# Patient Record
Sex: FEMALE | Race: WHITE | NOT HISPANIC OR LATINO
[De-identification: names, ages, dates, MRNs, and addresses within clinical notes are randomized per-mention and may not be internally consistent; named-entity substitution may affect disease eponyms.]

---

## 2019-11-22 ENCOUNTER — APPOINTMENT (OUTPATIENT)
Dept: HUMAN REPRODUCTION | Facility: CLINIC | Age: 39
End: 2019-11-22
Payer: COMMERCIAL

## 2019-11-22 PROCEDURE — 99205 OFFICE O/P NEW HI 60 MIN: CPT | Mod: 25

## 2019-11-22 PROCEDURE — 76830 TRANSVAGINAL US NON-OB: CPT

## 2019-12-27 ENCOUNTER — APPOINTMENT (OUTPATIENT)
Dept: RADIOLOGY | Facility: HOSPITAL | Age: 39
End: 2019-12-27
Payer: COMMERCIAL

## 2019-12-27 ENCOUNTER — OUTPATIENT (OUTPATIENT)
Dept: OUTPATIENT SERVICES | Facility: HOSPITAL | Age: 39
LOS: 1 days | End: 2019-12-27
Payer: COMMERCIAL

## 2019-12-27 DIAGNOSIS — N97.1 FEMALE INFERTILITY OF TUBAL ORIGIN: ICD-10-CM

## 2019-12-27 DIAGNOSIS — Z00.00 ENCOUNTER FOR GENERAL ADULT MEDICAL EXAMINATION WITHOUT ABNORMAL FINDINGS: ICD-10-CM

## 2019-12-27 PROCEDURE — 58340 CATHETER FOR HYSTEROGRAPHY: CPT

## 2019-12-27 PROCEDURE — 74740 X-RAY FEMALE GENITAL TRACT: CPT | Mod: 26

## 2019-12-27 PROCEDURE — 74740 X-RAY FEMALE GENITAL TRACT: CPT

## 2020-02-18 ENCOUNTER — APPOINTMENT (OUTPATIENT)
Dept: HUMAN REPRODUCTION | Facility: CLINIC | Age: 40
End: 2020-02-18

## 2020-03-30 ENCOUNTER — APPOINTMENT (OUTPATIENT)
Dept: HUMAN REPRODUCTION | Facility: CLINIC | Age: 40
End: 2020-03-30

## 2020-04-26 ENCOUNTER — MESSAGE (OUTPATIENT)
Age: 40
End: 2020-04-26

## 2020-05-02 LAB
SARS-COV-2 IGG SERPL IA-ACNC: 0.1 INDEX
SARS-COV-2 IGG SERPL QL IA: NEGATIVE

## 2021-09-21 ENCOUNTER — APPOINTMENT (OUTPATIENT)
Dept: HUMAN REPRODUCTION | Facility: CLINIC | Age: 41
End: 2021-09-21
Payer: COMMERCIAL

## 2021-09-21 PROCEDURE — 99214 OFFICE O/P EST MOD 30 MIN: CPT | Mod: 95

## 2022-07-05 ENCOUNTER — APPOINTMENT (OUTPATIENT)
Dept: OBGYN | Facility: CLINIC | Age: 42
End: 2022-07-05

## 2022-07-05 VITALS
HEIGHT: 65 IN | DIASTOLIC BLOOD PRESSURE: 70 MMHG | WEIGHT: 196 LBS | BODY MASS INDEX: 32.65 KG/M2 | SYSTOLIC BLOOD PRESSURE: 122 MMHG

## 2022-07-05 DIAGNOSIS — Z80.3 FAMILY HISTORY OF MALIGNANT NEOPLASM OF BREAST: ICD-10-CM

## 2022-07-05 DIAGNOSIS — Z01.411 ENCOUNTER FOR GYNECOLOGICAL EXAMINATION (GENERAL) (ROUTINE) WITH ABNORMAL FINDINGS: ICD-10-CM

## 2022-07-05 DIAGNOSIS — Z78.9 OTHER SPECIFIED HEALTH STATUS: ICD-10-CM

## 2022-07-05 DIAGNOSIS — Z00.00 ENCOUNTER FOR GENERAL ADULT MEDICAL EXAMINATION W/OUT ABNORMAL FINDINGS: ICD-10-CM

## 2022-07-05 PROCEDURE — 76830 TRANSVAGINAL US NON-OB: CPT

## 2022-07-05 PROCEDURE — 99386 PREV VISIT NEW AGE 40-64: CPT

## 2022-07-05 PROCEDURE — 0500F INITIAL PRENATAL CARE VISIT: CPT

## 2022-07-06 ENCOUNTER — TRANSCRIPTION ENCOUNTER (OUTPATIENT)
Age: 42
End: 2022-07-06

## 2022-07-09 LAB
ABO + RH PNL BLD: NORMAL
B19V IGG SER QL IA: 8.09 INDEX
B19V IGG+IGM SER-IMP: NORMAL
B19V IGG+IGM SER-IMP: POSITIVE
B19V IGM FLD-ACNC: 0.17 INDEX
B19V IGM SER-ACNC: NEGATIVE
BASOPHILS # BLD AUTO: 0.03 K/UL
BASOPHILS NFR BLD AUTO: 0.3 %
BLD GP AB SCN SERPL QL: NORMAL
C TRACH RRNA SPEC QL NAA+PROBE: NOT DETECTED
CYTOLOGY CVX/VAG DOC THIN PREP: ABNORMAL
EOSINOPHIL # BLD AUTO: 0.11 K/UL
EOSINOPHIL NFR BLD AUTO: 1.3 %
HBV SURFACE AG SER QL: NONREACTIVE
HCT VFR BLD CALC: 41.7 %
HGB A MFR BLD: 97.1 %
HGB A2 MFR BLD: 2.9 %
HGB BLD-MCNC: 13.6 G/DL
HGB FRACT BLD-IMP: NORMAL
HIV1+2 AB SPEC QL IA.RAPID: NONREACTIVE
IMM GRANULOCYTES NFR BLD AUTO: 0.5 %
LYMPHOCYTES # BLD AUTO: 1.22 K/UL
LYMPHOCYTES NFR BLD AUTO: 14.2 %
MAN DIFF?: NORMAL
MCHC RBC-ENTMCNC: 30.8 PG
MCHC RBC-ENTMCNC: 32.6 GM/DL
MCV RBC AUTO: 94.3 FL
MEV IGG FLD QL IA: 42.6 AU/ML
MEV IGG+IGM SER-IMP: POSITIVE
MONOCYTES # BLD AUTO: 0.51 K/UL
MONOCYTES NFR BLD AUTO: 5.9 %
N GONORRHOEA RRNA SPEC QL NAA+PROBE: NOT DETECTED
NEUTROPHILS # BLD AUTO: 6.67 K/UL
NEUTROPHILS NFR BLD AUTO: 77.8 %
PLATELET # BLD AUTO: 230 K/UL
RBC # BLD: 4.42 M/UL
RBC # FLD: 13.2 %
RUBV IGG FLD-ACNC: 2.3 INDEX
RUBV IGG SER-IMP: POSITIVE
SOURCE TP AMPLIFICATION: NORMAL
T PALLIDUM AB SER QL IA: NEGATIVE
TSH SERPL-ACNC: 1.14 UIU/ML
VZV AB TITR SER: POSITIVE
VZV IGG SER IF-ACNC: 461 INDEX
WBC # FLD AUTO: 8.58 K/UL

## 2022-07-09 RX ORDER — PNV NO.95/FERROUS FUM/FOLIC AC 28MG-0.8MG
TABLET ORAL
Refills: 0 | Status: ACTIVE | COMMUNITY

## 2022-07-11 LAB — AR GENE MUT ANL BLD/T: NORMAL

## 2022-07-12 LAB
FMR1 GENE MUT ANL BLD/T: NORMAL
T GONDII AB SER-IMP: NEGATIVE
T GONDII AB SER-IMP: POSITIVE
T GONDII IGG SER QL: 15.6 IU/ML
T GONDII IGM SER QL: <3 AU/ML

## 2022-07-13 ENCOUNTER — APPOINTMENT (OUTPATIENT)
Dept: OBGYN | Facility: CLINIC | Age: 42
End: 2022-07-13

## 2022-07-18 LAB — CFTR MUT TESTED BLD/T: NEGATIVE

## 2022-07-20 ENCOUNTER — APPOINTMENT (OUTPATIENT)
Dept: OBGYN | Facility: CLINIC | Age: 42
End: 2022-07-20

## 2022-07-20 VITALS
HEIGHT: 65 IN | BODY MASS INDEX: 33.15 KG/M2 | DIASTOLIC BLOOD PRESSURE: 62 MMHG | WEIGHT: 199 LBS | SYSTOLIC BLOOD PRESSURE: 100 MMHG

## 2022-07-20 PROCEDURE — 76817 TRANSVAGINAL US OBSTETRIC: CPT

## 2022-07-20 PROCEDURE — 0502F SUBSEQUENT PRENATAL CARE: CPT

## 2022-08-11 ENCOUNTER — APPOINTMENT (OUTPATIENT)
Dept: ANTEPARTUM | Facility: CLINIC | Age: 42
End: 2022-08-11

## 2022-08-11 ENCOUNTER — LABORATORY RESULT (OUTPATIENT)
Age: 42
End: 2022-08-11

## 2022-08-11 ENCOUNTER — ASOB RESULT (OUTPATIENT)
Age: 42
End: 2022-08-11

## 2022-08-11 PROCEDURE — 36415 COLL VENOUS BLD VENIPUNCTURE: CPT

## 2022-08-11 PROCEDURE — 76801 OB US < 14 WKS SINGLE FETUS: CPT

## 2022-08-11 PROCEDURE — 76813 OB US NUCHAL MEAS 1 GEST: CPT

## 2022-08-17 ENCOUNTER — APPOINTMENT (OUTPATIENT)
Dept: OBGYN | Facility: CLINIC | Age: 42
End: 2022-08-17

## 2022-08-17 VITALS
SYSTOLIC BLOOD PRESSURE: 116 MMHG | HEIGHT: 65 IN | DIASTOLIC BLOOD PRESSURE: 72 MMHG | WEIGHT: 201 LBS | BODY MASS INDEX: 33.49 KG/M2

## 2022-08-17 LAB
BILIRUB UR QL STRIP: NORMAL
GLUCOSE UR-MCNC: NORMAL
HCG UR QL: 0.2 EU/DL
HGB UR QL STRIP.AUTO: NORMAL
KETONES UR-MCNC: NORMAL
LEUKOCYTE ESTERASE UR QL STRIP: NORMAL
NITRITE UR QL STRIP: NORMAL
PH UR STRIP: 7.5
PROT UR STRIP-MCNC: NORMAL
SP GR UR STRIP: 1.02

## 2022-08-17 PROCEDURE — 36415 COLL VENOUS BLD VENIPUNCTURE: CPT

## 2022-08-17 PROCEDURE — 0502F SUBSEQUENT PRENATAL CARE: CPT

## 2022-08-18 ENCOUNTER — NON-APPOINTMENT (OUTPATIENT)
Age: 42
End: 2022-08-18

## 2022-08-29 LAB
HCV AB SER QL: NONREACTIVE
HCV S/CO RATIO: 0.1 S/CO

## 2022-09-12 ENCOUNTER — LABORATORY RESULT (OUTPATIENT)
Age: 42
End: 2022-09-12

## 2022-09-13 ENCOUNTER — APPOINTMENT (OUTPATIENT)
Dept: OBGYN | Facility: CLINIC | Age: 42
End: 2022-09-13

## 2022-09-13 VITALS
DIASTOLIC BLOOD PRESSURE: 70 MMHG | HEIGHT: 65 IN | WEIGHT: 204 LBS | SYSTOLIC BLOOD PRESSURE: 100 MMHG | BODY MASS INDEX: 33.99 KG/M2

## 2022-09-13 PROCEDURE — 0502F SUBSEQUENT PRENATAL CARE: CPT

## 2022-09-13 PROCEDURE — 36415 COLL VENOUS BLD VENIPUNCTURE: CPT

## 2022-09-19 LAB
BILIRUB UR QL STRIP: NORMAL
GLUCOSE UR-MCNC: NORMAL
HCG UR QL: 0.2 EU/DL
HGB UR QL STRIP.AUTO: NORMAL
KETONES UR-MCNC: 15
LEUKOCYTE ESTERASE UR QL STRIP: NORMAL
NITRITE UR QL STRIP: NORMAL
PH UR STRIP: 7
PROT UR STRIP-MCNC: NORMAL
SP GR UR STRIP: 1.02

## 2022-10-06 ENCOUNTER — APPOINTMENT (OUTPATIENT)
Dept: ANTEPARTUM | Facility: CLINIC | Age: 42
End: 2022-10-06

## 2022-10-06 ENCOUNTER — ASOB RESULT (OUTPATIENT)
Age: 42
End: 2022-10-06

## 2022-10-06 PROCEDURE — 76811 OB US DETAILED SNGL FETUS: CPT

## 2022-10-06 PROCEDURE — 99202 OFFICE O/P NEW SF 15 MIN: CPT | Mod: 25

## 2022-10-11 ENCOUNTER — APPOINTMENT (OUTPATIENT)
Dept: OBGYN | Facility: CLINIC | Age: 42
End: 2022-10-11

## 2022-10-11 VITALS
WEIGHT: 215 LBS | BODY MASS INDEX: 35.82 KG/M2 | SYSTOLIC BLOOD PRESSURE: 120 MMHG | HEIGHT: 65 IN | DIASTOLIC BLOOD PRESSURE: 70 MMHG

## 2022-10-11 PROCEDURE — 0502F SUBSEQUENT PRENATAL CARE: CPT

## 2022-10-29 LAB
BILIRUB UR QL STRIP: NORMAL
GLUCOSE UR-MCNC: NORMAL
HCG UR QL: 0.2 EU/DL
HGB UR QL STRIP.AUTO: NORMAL
KETONES UR-MCNC: NORMAL
LEUKOCYTE ESTERASE UR QL STRIP: NORMAL
NITRITE UR QL STRIP: NORMAL
PH UR STRIP: 5.5
PROT UR STRIP-MCNC: NORMAL
SP GR UR STRIP: 1.03

## 2022-11-07 ENCOUNTER — APPOINTMENT (OUTPATIENT)
Dept: OBGYN | Facility: CLINIC | Age: 42
End: 2022-11-07

## 2022-11-07 VITALS
HEIGHT: 65 IN | SYSTOLIC BLOOD PRESSURE: 126 MMHG | WEIGHT: 216 LBS | BODY MASS INDEX: 35.99 KG/M2 | DIASTOLIC BLOOD PRESSURE: 72 MMHG

## 2022-11-07 PROCEDURE — 0502F SUBSEQUENT PRENATAL CARE: CPT

## 2022-11-20 LAB
BILIRUB UR QL STRIP: NORMAL
GLUCOSE UR-MCNC: NORMAL
HCG UR QL: 0.2 EU/DL
HGB UR QL STRIP.AUTO: NORMAL
KETONES UR-MCNC: NORMAL
LEUKOCYTE ESTERASE UR QL STRIP: NORMAL
NITRITE UR QL STRIP: NORMAL
PH UR STRIP: 7
PROT UR STRIP-MCNC: NORMAL
SP GR UR STRIP: 1.02

## 2022-12-01 ENCOUNTER — APPOINTMENT (OUTPATIENT)
Dept: ANTEPARTUM | Facility: CLINIC | Age: 42
End: 2022-12-01

## 2022-12-01 ENCOUNTER — ASOB RESULT (OUTPATIENT)
Age: 42
End: 2022-12-01

## 2022-12-01 PROCEDURE — 76816 OB US FOLLOW-UP PER FETUS: CPT

## 2022-12-01 PROCEDURE — 76819 FETAL BIOPHYS PROFIL W/O NST: CPT | Mod: 59

## 2022-12-06 ENCOUNTER — APPOINTMENT (OUTPATIENT)
Dept: OBGYN | Facility: CLINIC | Age: 42
End: 2022-12-06

## 2022-12-06 VITALS
WEIGHT: 222 LBS | BODY MASS INDEX: 36.99 KG/M2 | DIASTOLIC BLOOD PRESSURE: 64 MMHG | HEIGHT: 65 IN | SYSTOLIC BLOOD PRESSURE: 128 MMHG

## 2022-12-06 PROCEDURE — 0502F SUBSEQUENT PRENATAL CARE: CPT

## 2022-12-07 LAB
BASOPHILS # BLD AUTO: 0.04 K/UL
BASOPHILS NFR BLD AUTO: 0.3 %
BILIRUB UR QL STRIP: NORMAL
EOSINOPHIL # BLD AUTO: 0.13 K/UL
EOSINOPHIL NFR BLD AUTO: 0.9 %
GLUCOSE UR-MCNC: NORMAL
HCG UR QL: 0.2 EU/DL
HCT VFR BLD CALC: 37.9 %
HGB BLD-MCNC: 12.8 G/DL
HGB UR QL STRIP.AUTO: NORMAL
HIV1+2 AB SPEC QL IA.RAPID: NONREACTIVE
IMM GRANULOCYTES NFR BLD AUTO: 1.4 %
KETONES UR-MCNC: NORMAL
LEUKOCYTE ESTERASE UR QL STRIP: NORMAL
LYMPHOCYTES # BLD AUTO: 1.66 K/UL
LYMPHOCYTES NFR BLD AUTO: 11.9 %
MAN DIFF?: NORMAL
MCHC RBC-ENTMCNC: 31.2 PG
MCHC RBC-ENTMCNC: 33.8 GM/DL
MCV RBC AUTO: 92.4 FL
MONOCYTES # BLD AUTO: 0.73 K/UL
MONOCYTES NFR BLD AUTO: 5.2 %
NEUTROPHILS # BLD AUTO: 11.18 K/UL
NEUTROPHILS NFR BLD AUTO: 80.3 %
NITRITE UR QL STRIP: NORMAL
PH UR STRIP: 5.5
PLATELET # BLD AUTO: 239 K/UL
PROT UR STRIP-MCNC: NORMAL
RBC # BLD: 4.1 M/UL
RBC # FLD: 13.2 %
SP GR UR STRIP: 1.03
WBC # FLD AUTO: 13.94 K/UL

## 2022-12-07 RX ORDER — BLOOD-GLUCOSE METER
W/DEVICE KIT MISCELLANEOUS
Qty: 1 | Refills: 0 | Status: DISCONTINUED | COMMUNITY
Start: 2022-12-07 | End: 2022-12-07

## 2022-12-19 ENCOUNTER — APPOINTMENT (OUTPATIENT)
Dept: MATERNAL FETAL MEDICINE | Facility: CLINIC | Age: 42
End: 2022-12-19

## 2022-12-19 ENCOUNTER — ASOB RESULT (OUTPATIENT)
Age: 42
End: 2022-12-19

## 2022-12-19 PROCEDURE — G0109 DIAB MANAGE TRN IND/GROUP: CPT | Mod: 95

## 2022-12-20 ENCOUNTER — APPOINTMENT (OUTPATIENT)
Dept: OBGYN | Facility: CLINIC | Age: 42
End: 2022-12-20

## 2022-12-20 VITALS
SYSTOLIC BLOOD PRESSURE: 114 MMHG | WEIGHT: 223 LBS | HEIGHT: 65 IN | DIASTOLIC BLOOD PRESSURE: 66 MMHG | BODY MASS INDEX: 37.15 KG/M2

## 2022-12-20 PROCEDURE — 0502F SUBSEQUENT PRENATAL CARE: CPT

## 2022-12-21 ENCOUNTER — APPOINTMENT (OUTPATIENT)
Dept: MATERNAL FETAL MEDICINE | Facility: CLINIC | Age: 42
End: 2022-12-21

## 2022-12-26 LAB
BILIRUB UR QL STRIP: NORMAL
GLUCOSE UR-MCNC: NORMAL
HCG UR QL: 0.2 EU/DL
HGB UR QL STRIP.AUTO: NORMAL
KETONES UR-MCNC: NORMAL
LEUKOCYTE ESTERASE UR QL STRIP: NORMAL
NITRITE UR QL STRIP: NORMAL
PH UR STRIP: 6
PROT UR STRIP-MCNC: NORMAL
SP GR UR STRIP: 1.01

## 2022-12-28 ENCOUNTER — APPOINTMENT (OUTPATIENT)
Dept: ANTEPARTUM | Facility: CLINIC | Age: 42
End: 2022-12-28
Payer: COMMERCIAL

## 2022-12-28 ENCOUNTER — ASOB RESULT (OUTPATIENT)
Age: 42
End: 2022-12-28

## 2022-12-28 PROCEDURE — 76816 OB US FOLLOW-UP PER FETUS: CPT

## 2022-12-28 PROCEDURE — 76819 FETAL BIOPHYS PROFIL W/O NST: CPT

## 2023-01-03 ENCOUNTER — ASOB RESULT (OUTPATIENT)
Age: 43
End: 2023-01-03

## 2023-01-03 ENCOUNTER — APPOINTMENT (OUTPATIENT)
Dept: MATERNAL FETAL MEDICINE | Facility: CLINIC | Age: 43
End: 2023-01-03
Payer: COMMERCIAL

## 2023-01-03 ENCOUNTER — APPOINTMENT (OUTPATIENT)
Dept: OBGYN | Facility: CLINIC | Age: 43
End: 2023-01-03
Payer: COMMERCIAL

## 2023-01-03 VITALS
SYSTOLIC BLOOD PRESSURE: 104 MMHG | HEIGHT: 65 IN | DIASTOLIC BLOOD PRESSURE: 62 MMHG | BODY MASS INDEX: 37.32 KG/M2 | WEIGHT: 224 LBS

## 2023-01-03 PROCEDURE — G0108 DIAB MANAGE TRN  PER INDIV: CPT | Mod: 95

## 2023-01-03 PROCEDURE — 0502F SUBSEQUENT PRENATAL CARE: CPT

## 2023-01-17 ENCOUNTER — APPOINTMENT (OUTPATIENT)
Dept: MATERNAL FETAL MEDICINE | Facility: CLINIC | Age: 43
End: 2023-01-17
Payer: COMMERCIAL

## 2023-01-17 ENCOUNTER — APPOINTMENT (OUTPATIENT)
Dept: OBGYN | Facility: CLINIC | Age: 43
End: 2023-01-17
Payer: COMMERCIAL

## 2023-01-17 ENCOUNTER — ASOB RESULT (OUTPATIENT)
Age: 43
End: 2023-01-17

## 2023-01-17 VITALS
BODY MASS INDEX: 37.49 KG/M2 | DIASTOLIC BLOOD PRESSURE: 70 MMHG | HEIGHT: 65 IN | SYSTOLIC BLOOD PRESSURE: 122 MMHG | WEIGHT: 225 LBS

## 2023-01-17 PROCEDURE — 0502F SUBSEQUENT PRENATAL CARE: CPT

## 2023-01-17 PROCEDURE — G0108 DIAB MANAGE TRN  PER INDIV: CPT | Mod: 95

## 2023-01-25 ENCOUNTER — ASOB RESULT (OUTPATIENT)
Age: 43
End: 2023-01-25

## 2023-01-25 ENCOUNTER — APPOINTMENT (OUTPATIENT)
Dept: ANTEPARTUM | Facility: CLINIC | Age: 43
End: 2023-01-25
Payer: COMMERCIAL

## 2023-01-25 ENCOUNTER — APPOINTMENT (OUTPATIENT)
Dept: ANTEPARTUM | Facility: CLINIC | Age: 43
End: 2023-01-25

## 2023-01-25 PROCEDURE — 76818 FETAL BIOPHYS PROFILE W/NST: CPT | Mod: 59

## 2023-01-25 PROCEDURE — 76816 OB US FOLLOW-UP PER FETUS: CPT

## 2023-01-26 ENCOUNTER — ASOB RESULT (OUTPATIENT)
Age: 43
End: 2023-01-26

## 2023-01-26 ENCOUNTER — APPOINTMENT (OUTPATIENT)
Dept: ANTEPARTUM | Facility: CLINIC | Age: 43
End: 2023-01-26
Payer: COMMERCIAL

## 2023-01-26 ENCOUNTER — TRANSCRIPTION ENCOUNTER (OUTPATIENT)
Age: 43
End: 2023-01-26

## 2023-01-26 ENCOUNTER — INPATIENT (INPATIENT)
Facility: HOSPITAL | Age: 43
LOS: 2 days | Discharge: HOME | End: 2023-01-29
Attending: OBSTETRICS & GYNECOLOGY | Admitting: OBSTETRICS & GYNECOLOGY
Payer: COMMERCIAL

## 2023-01-26 VITALS
DIASTOLIC BLOOD PRESSURE: 76 MMHG | SYSTOLIC BLOOD PRESSURE: 119 MMHG | TEMPERATURE: 98 F | HEART RATE: 99 BPM | RESPIRATION RATE: 16 BRPM

## 2023-01-26 DIAGNOSIS — O40.3XX0 POLYHYDRAMNIOS, THIRD TRIMESTER, NOT APPLICABLE OR UNSPECIFIED: ICD-10-CM

## 2023-01-26 DIAGNOSIS — O24.419 GESTATIONAL DIABETES MELLITUS IN PREGNANCY, UNSPECIFIED CONTROL: ICD-10-CM

## 2023-01-26 DIAGNOSIS — O42.919 PRETERM PREMATURE RUPTURE OF MEMBRANES, UNSPECIFIED AS TO LENGTH OF TIME BETWEEN RUPTURE AND ONSET OF LABOR, UNSPECIFIED TRIMESTER: ICD-10-CM

## 2023-01-26 DIAGNOSIS — O26.899 OTHER SPECIFIED PREGNANCY RELATED CONDITIONS, UNSPECIFIED TRIMESTER: ICD-10-CM

## 2023-01-26 DIAGNOSIS — T14.8XXA OTHER INJURY OF UNSPECIFIED BODY REGION, INITIAL ENCOUNTER: Chronic | ICD-10-CM

## 2023-01-26 LAB
BASOPHILS # BLD AUTO: 0.03 K/UL — SIGNIFICANT CHANGE UP (ref 0–0.2)
BASOPHILS NFR BLD AUTO: 0.2 % — SIGNIFICANT CHANGE UP (ref 0–2)
BLD GP AB SCN SERPL QL: NEGATIVE — SIGNIFICANT CHANGE UP
COVID-19 SPIKE DOMAIN AB INTERP: POSITIVE
COVID-19 SPIKE DOMAIN ANTIBODY RESULT: >250 U/ML — HIGH
EOSINOPHIL # BLD AUTO: 0.09 K/UL — SIGNIFICANT CHANGE UP (ref 0–0.5)
EOSINOPHIL NFR BLD AUTO: 0.7 % — SIGNIFICANT CHANGE UP (ref 0–6)
GLUCOSE BLDC GLUCOMTR-MCNC: 101 MG/DL — HIGH (ref 70–99)
GLUCOSE BLDC GLUCOMTR-MCNC: 81 MG/DL — SIGNIFICANT CHANGE UP (ref 70–99)
GLUCOSE BLDC GLUCOMTR-MCNC: 97 MG/DL — SIGNIFICANT CHANGE UP (ref 70–99)
HCT VFR BLD CALC: 39.8 % — SIGNIFICANT CHANGE UP (ref 34.5–45)
HGB BLD-MCNC: 13 G/DL — SIGNIFICANT CHANGE UP (ref 11.5–15.5)
IANC: 11.53 K/UL — HIGH (ref 1.8–7.4)
IMM GRANULOCYTES NFR BLD AUTO: 0.7 % — SIGNIFICANT CHANGE UP (ref 0–0.9)
LYMPHOCYTES # BLD AUTO: 1.24 K/UL — SIGNIFICANT CHANGE UP (ref 1–3.3)
LYMPHOCYTES # BLD AUTO: 9 % — LOW (ref 13–44)
MCHC RBC-ENTMCNC: 30.4 PG — SIGNIFICANT CHANGE UP (ref 27–34)
MCHC RBC-ENTMCNC: 32.7 GM/DL — SIGNIFICANT CHANGE UP (ref 32–36)
MCV RBC AUTO: 93.2 FL — SIGNIFICANT CHANGE UP (ref 80–100)
MONOCYTES # BLD AUTO: 0.78 K/UL — SIGNIFICANT CHANGE UP (ref 0–0.9)
MONOCYTES NFR BLD AUTO: 5.7 % — SIGNIFICANT CHANGE UP (ref 2–14)
NEUTROPHILS # BLD AUTO: 11.53 K/UL — HIGH (ref 1.8–7.4)
NEUTROPHILS NFR BLD AUTO: 83.7 % — HIGH (ref 43–77)
NRBC # BLD: 0 /100 WBCS — SIGNIFICANT CHANGE UP (ref 0–0)
NRBC # FLD: 0 K/UL — SIGNIFICANT CHANGE UP (ref 0–0)
PLATELET # BLD AUTO: 219 K/UL — SIGNIFICANT CHANGE UP (ref 150–400)
RBC # BLD: 4.27 M/UL — SIGNIFICANT CHANGE UP (ref 3.8–5.2)
RBC # FLD: 13.5 % — SIGNIFICANT CHANGE UP (ref 10.3–14.5)
RH IG SCN BLD-IMP: POSITIVE — SIGNIFICANT CHANGE UP
RH IG SCN BLD-IMP: POSITIVE — SIGNIFICANT CHANGE UP
SARS-COV-2 IGG+IGM SERPL QL IA: >250 U/ML — HIGH
SARS-COV-2 IGG+IGM SERPL QL IA: POSITIVE
SARS-COV-2 RNA SPEC QL NAA+PROBE: SIGNIFICANT CHANGE UP
T PALLIDUM AB TITR SER: NEGATIVE — SIGNIFICANT CHANGE UP
WBC # BLD: 13.77 K/UL — HIGH (ref 3.8–10.5)
WBC # FLD AUTO: 13.77 K/UL — HIGH (ref 3.8–10.5)

## 2023-01-26 PROCEDURE — 76815 OB US LIMITED FETUS(S): CPT | Mod: 26

## 2023-01-26 RX ORDER — SODIUM CHLORIDE 9 MG/ML
300 INJECTION INTRAMUSCULAR; INTRAVENOUS; SUBCUTANEOUS ONCE
Refills: 0 | Status: COMPLETED | OUTPATIENT
Start: 2023-01-26 | End: 2023-01-26

## 2023-01-26 RX ORDER — SODIUM CHLORIDE 9 MG/ML
1000 INJECTION INTRAMUSCULAR; INTRAVENOUS; SUBCUTANEOUS
Refills: 0 | Status: DISCONTINUED | OUTPATIENT
Start: 2023-01-26 | End: 2023-01-27

## 2023-01-26 RX ORDER — SODIUM CHLORIDE 9 MG/ML
1000 INJECTION, SOLUTION INTRAVENOUS
Refills: 0 | Status: DISCONTINUED | OUTPATIENT
Start: 2023-01-26 | End: 2023-01-27

## 2023-01-26 RX ORDER — OXYTOCIN 10 UNIT/ML
2 VIAL (ML) INJECTION
Qty: 30 | Refills: 0 | Status: DISCONTINUED | OUTPATIENT
Start: 2023-01-26 | End: 2023-01-27

## 2023-01-26 RX ORDER — AMPICILLIN TRIHYDRATE 250 MG
1 CAPSULE ORAL EVERY 4 HOURS
Refills: 0 | Status: DISCONTINUED | OUTPATIENT
Start: 2023-01-26 | End: 2023-01-27

## 2023-01-26 RX ORDER — AMPICILLIN TRIHYDRATE 250 MG
2 CAPSULE ORAL ONCE
Refills: 0 | Status: COMPLETED | OUTPATIENT
Start: 2023-01-26 | End: 2023-01-26

## 2023-01-26 RX ORDER — CHLORHEXIDINE GLUCONATE 213 G/1000ML
1 SOLUTION TOPICAL ONCE
Refills: 0 | Status: DISCONTINUED | OUTPATIENT
Start: 2023-01-26 | End: 2023-01-27

## 2023-01-26 RX ORDER — OXYTOCIN 10 UNIT/ML
333.33 VIAL (ML) INJECTION
Qty: 20 | Refills: 0 | Status: DISCONTINUED | OUTPATIENT
Start: 2023-01-26 | End: 2023-01-27

## 2023-01-26 RX ADMIN — Medication 108 GRAM(S): at 14:45

## 2023-01-26 RX ADMIN — Medication 2 MILLIUNIT(S)/MIN: at 14:46

## 2023-01-26 RX ADMIN — SODIUM CHLORIDE 125 MILLILITER(S): 9 INJECTION INTRAMUSCULAR; INTRAVENOUS; SUBCUTANEOUS at 18:25

## 2023-01-26 RX ADMIN — Medication 108 GRAM(S): at 18:49

## 2023-01-26 RX ADMIN — SODIUM CHLORIDE 125 MILLILITER(S): 9 INJECTION, SOLUTION INTRAVENOUS at 14:46

## 2023-01-26 RX ADMIN — Medication 108 GRAM(S): at 22:53

## 2023-01-26 RX ADMIN — SODIUM CHLORIDE 600 MILLILITER(S): 9 INJECTION INTRAMUSCULAR; INTRAVENOUS; SUBCUTANEOUS at 13:20

## 2023-01-26 RX ADMIN — Medication 200 GRAM(S): at 11:10

## 2023-01-26 RX ADMIN — SODIUM CHLORIDE 125 MILLILITER(S): 9 INJECTION, SOLUTION INTRAVENOUS at 18:50

## 2023-01-26 RX ADMIN — SODIUM CHLORIDE 125 MILLILITER(S): 9 INJECTION, SOLUTION INTRAVENOUS at 22:54

## 2023-01-26 NOTE — OB PROVIDER H&P - NSHPPHYSICALEXAM_GEN_ALL_CORE
– PE:   CV: RRR  Pulm: breathing comfortably on RA  Abd: soft, gravid, nontender  Extr: moving all extremities with ease  Spec: + pooling, + nitrazine, + ferning, no bleeding present  VE: 2/60/-3  FHT: baseline 135, moderate variability, +accels, -decels  [place]: q 6 mins  TAUS: report in ASOB, vertex position, anterior placenta

## 2023-01-26 NOTE — OB PROVIDER TRIAGE NOTE - HISTORY OF PRESENT ILLNESS
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 Patient reports  + fetal movements, denies vaginal bleeding. Pt denies any other concerns.  Antepartum course is significant for:  - Gestational diabetes, controlled with diet  - Mild polyhydramnios, last ELIZABETH 21.14 and MVP 8 on 23  - GERD. 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 Safe at home/in relationship.    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 Patient reports  + fetal movements, denies vaginal bleeding. Pt denies any other concerns.  Antepartum course is significant for:  - Gestational diabetes, controlled with diet  - Mild polyhydramnios, last ELIZABETH 21.14 and MVP 8 on 23  - GERD. controlled with tums prn  GBS unknown   US on 23: Fetal size is appropriate for gestational age, EFW is at the 85th percentile, AC at 95th percentile, ELIZABETH of 21, with an MVP of 8.87 cm consistent with mild polyhydramnios, BPP 8/8.  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 Safe at home/in relationship.    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 Patient reports  + fetal movements, denies vaginal bleeding. Pt denies any other concerns.  Antepartum course is significant for:  - Gestational diabetes, controlled with diet  - Mild polyhydramnios, last ELIZABETH 21 and MVP 8 on 23  - GERD. controlled with tums prn  GBS unknown   US on 23: Fetal size is appropriate for gestational age, EFW is at the 85th percentile, AC at 95th percentile, ELIZABETH of 21, with an MVP of 8.87 cm consistent with mild polyhydramnios, BPP 8/8.  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 Safe at home/in relationship.    Meds: PNV, ASA    No Known Allergies

## 2023-01-26 NOTE — OB PROVIDER LABOR PROGRESS NOTE - ASSESSMENT
42 y.o  @ 36 weeks IOL for PPROM, GBS unknown    -ephedrine given by anesthesia team with improvement in BPs to 110s/50s-60s  -exam unchanged  -Amp for unknown GBS  -PO Cytotec to be given now    d/w Dr. Antwon Patino NP

## 2023-01-26 NOTE — OB PROVIDER H&P - NS_OBGYNHISTORY_OBGYN_ALL_OB_FT
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

## 2023-01-26 NOTE — OB PROVIDER LABOR PROGRESS NOTE - ASSESSMENT
P1 IOL for PPROM at 36w, patient stable with intermittent cat 2 tracing responding to resuscitation  - Cont IUPC/AI  - for pitocin after 30 min cat 1 tracing    D/w Dr. Antwon Sorto PGY1

## 2023-01-26 NOTE — OB PROVIDER TRIAGE NOTE - NSHPPHYSICALEXAM_GEN_ALL_CORE
– PE:   CV: RRR  Pulm: breathing comfortably on RA  Abd: soft, gravid, nontender  Extr: moving all extremities with ease  Spec: + pooling, + nitrazine, + ferning, no bleeding present  VE: 2/60/-3  FHT: baseline 135, moderate variability, +accels, -decels  [place]: q 6 mins  TAUS: report in ASOB, vertex position, anterior placenta – PE:   CV: RRR  Pulm: breathing comfortably on RA  Abd: soft, gravid, nontender  Extr: moving all extremities with ease  Spec: + pooling, + nitrazine, + ferning, no bleeding present  VE: 2/60/-3  FHT: baseline 135, moderate variability, +accels, -decels  [place]: q 6 mins  TAUS: report in ASOB, vertex position, anterior placenta, MVP 5

## 2023-01-26 NOTE — OB PROVIDER H&P - NSHPSOCIALHISTORY_GEN_ALL_CORE
Pt denies tobacco, drug, or alcohol use. Pt feels safe at home. Pt denies history of depression or anxiety.

## 2023-01-26 NOTE — OB PROVIDER TRIAGE NOTE - CURRENT PREGNANCY COMPLICATIONS, OB PROFILE
AMA, LGA per pt/Gestational Diabetes/Gestational Age less than 36 Weeks AMA, LGA per pt/Gestational Diabetes/ Premature Rupture of Membranes (PPROM)/Gestational Age less than 36 Weeks AMA, LGA per pt/Gestational Diabetes/Polyhydramnios/ Premature Rupture of Membranes (PPROM)/Abnormal Amniotic Fluid Volume/Gestational Age less than 36 Weeks

## 2023-01-26 NOTE — OB PROVIDER TRIAGE NOTE - NSOBPROVIDERNOTE_OBGYN_ALL_OB_FT
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    - Discussed with Dr. Kapoor  - Admit to Labor and Delivery for PPROM and labor  - Admission consents signed  - COVID-19 PCR swabbed and sent  - For PO cytotec   - For ampicillin for GBS unknown status  - Epidural for pain management

## 2023-01-26 NOTE — OB RN TRIAGE NOTE - LIVING CHILDREN, OB PROFILE
Hpi Title: Evaluation of Skin Lesions How Severe Are Your Spot(S)?: mild Have Your Spot(S) Been Treated In The Past?: has not been treated Family Member: Brother 1

## 2023-01-26 NOTE — OB PROVIDER H&P - NSHPREVIEWOFSYSTEMS_GEN_ALL_CORE
General: denies fever, malaise, or body aches  Cardiovascular: denies murmurs, history of cardiomyopathy, palpitations, or chest pain   Respiratory: denies cough, wheeze, or SOB  Hematological: denies blood clotting disorder, history of pulmonary embolism or DVT, history of blood transfusion

## 2023-01-26 NOTE — OB PROVIDER H&P - CURRENT PREGNANCY COMPLICATIONS, OB PROFILE
AMA, LGA per pt/Gestational Diabetes/Polyhydramnios/ Premature Rupture of Membranes (PPROM)/Abnormal Amniotic Fluid Volume/Gestational Age less than 36 Weeks/Maternal Unknown GBS

## 2023-01-26 NOTE — OB PROVIDER TRIAGE NOTE - NS_OBGYNHISTORY_OBGYN_ALL_OB_FT
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

## 2023-01-26 NOTE — OB PROVIDER LABOR PROGRESS NOTE - ASSESSMENT
42 y.o  PPROM @ 36 weeks, s/p PO Cytotec    -IUPC placed without incident  -Amnioinfusion to be started  -For Pitocin once tracing improves  -continue Amp  -continue efm    d/w Dr. Antwon Patino NP

## 2023-01-26 NOTE — OB PROVIDER H&P - NSLOWPPHRISK_OBGYN_A_OB
No previous uterine incision/Low Pregnancy/Less than or equal to 4 previous vaginal births/No known bleeding disorder/No history of postpartum hemorrhage

## 2023-01-26 NOTE — OB RN PATIENT PROFILE - HOW OFTEN DO YOU HAVE A DRINK CONTAINING ALCOHOL?
You can access the FollowMyHealth Patient Portal offered by [facility] by registering at the following website: [email or web address]. By joining [employer]’s FollowMyHealth portal, you will also be able to view your health information using other applications (apps) compatible with our system.
Never

## 2023-01-27 ENCOUNTER — TRANSCRIPTION ENCOUNTER (OUTPATIENT)
Age: 43
End: 2023-01-27

## 2023-01-27 LAB
GLUCOSE BLDC GLUCOMTR-MCNC: 101 MG/DL — HIGH (ref 70–99)
GLUCOSE BLDC GLUCOMTR-MCNC: 103 MG/DL — HIGH (ref 70–99)
GLUCOSE BLDC GLUCOMTR-MCNC: 121 MG/DL — HIGH (ref 70–99)
GLUCOSE BLDC GLUCOMTR-MCNC: 93 MG/DL — SIGNIFICANT CHANGE UP (ref 70–99)

## 2023-01-27 PROCEDURE — 59510 CESAREAN DELIVERY: CPT

## 2023-01-27 RX ORDER — BUTORPHANOL TARTRATE 2 MG/ML
0.25 INJECTION, SOLUTION INTRAMUSCULAR; INTRAVENOUS EVERY 6 HOURS
Refills: 0 | Status: DISCONTINUED | OUTPATIENT
Start: 2023-01-27 | End: 2023-01-28

## 2023-01-27 RX ORDER — SODIUM CHLORIDE 9 MG/ML
1000 INJECTION, SOLUTION INTRAVENOUS ONCE
Refills: 0 | Status: DISCONTINUED | OUTPATIENT
Start: 2023-01-27 | End: 2023-01-28

## 2023-01-27 RX ORDER — DIPHENHYDRAMINE HCL 50 MG
25 CAPSULE ORAL EVERY 6 HOURS
Refills: 0 | Status: DISCONTINUED | OUTPATIENT
Start: 2023-01-27 | End: 2023-01-29

## 2023-01-27 RX ORDER — SODIUM CHLORIDE 9 MG/ML
300 INJECTION INTRAMUSCULAR; INTRAVENOUS; SUBCUTANEOUS ONCE
Refills: 0 | Status: DISCONTINUED | OUTPATIENT
Start: 2023-01-27 | End: 2023-01-27

## 2023-01-27 RX ORDER — NALOXONE HYDROCHLORIDE 4 MG/.1ML
0.1 SPRAY NASAL
Refills: 0 | Status: DISCONTINUED | OUTPATIENT
Start: 2023-01-27 | End: 2023-01-28

## 2023-01-27 RX ORDER — IBUPROFEN 200 MG
600 TABLET ORAL EVERY 6 HOURS
Refills: 0 | Status: COMPLETED | OUTPATIENT
Start: 2023-01-27 | End: 2023-12-26

## 2023-01-27 RX ORDER — ACETAMINOPHEN 500 MG
975 TABLET ORAL
Refills: 0 | Status: DISCONTINUED | OUTPATIENT
Start: 2023-01-27 | End: 2023-01-29

## 2023-01-27 RX ORDER — DEXAMETHASONE 0.5 MG/5ML
4 ELIXIR ORAL EVERY 6 HOURS
Refills: 0 | Status: DISCONTINUED | OUTPATIENT
Start: 2023-01-27 | End: 2023-01-28

## 2023-01-27 RX ORDER — KETOROLAC TROMETHAMINE 30 MG/ML
30 SYRINGE (ML) INJECTION EVERY 6 HOURS
Refills: 0 | Status: DISCONTINUED | OUTPATIENT
Start: 2023-01-27 | End: 2023-01-28

## 2023-01-27 RX ORDER — OXYCODONE HYDROCHLORIDE 5 MG/1
5 TABLET ORAL
Refills: 0 | Status: DISCONTINUED | OUTPATIENT
Start: 2023-01-27 | End: 2023-01-29

## 2023-01-27 RX ORDER — SODIUM CHLORIDE 9 MG/ML
1000 INJECTION INTRAMUSCULAR; INTRAVENOUS; SUBCUTANEOUS ONCE
Refills: 0 | Status: COMPLETED | OUTPATIENT
Start: 2023-01-27 | End: 2023-01-27

## 2023-01-27 RX ORDER — SODIUM CHLORIDE 9 MG/ML
1000 INJECTION, SOLUTION INTRAVENOUS
Refills: 0 | Status: DISCONTINUED | OUTPATIENT
Start: 2023-01-27 | End: 2023-01-28

## 2023-01-27 RX ORDER — LANOLIN
1 OINTMENT (GRAM) TOPICAL EVERY 6 HOURS
Refills: 0 | Status: DISCONTINUED | OUTPATIENT
Start: 2023-01-27 | End: 2023-01-29

## 2023-01-27 RX ORDER — OXYTOCIN 10 UNIT/ML
333.33 VIAL (ML) INJECTION
Qty: 20 | Refills: 0 | Status: DISCONTINUED | OUTPATIENT
Start: 2023-01-27 | End: 2023-01-28

## 2023-01-27 RX ORDER — SODIUM CHLORIDE 9 MG/ML
500 INJECTION, SOLUTION INTRAVENOUS ONCE
Refills: 0 | Status: DISCONTINUED | OUTPATIENT
Start: 2023-01-27 | End: 2023-01-29

## 2023-01-27 RX ORDER — OXYCODONE HYDROCHLORIDE 5 MG/1
5 TABLET ORAL
Refills: 0 | Status: DISCONTINUED | OUTPATIENT
Start: 2023-01-27 | End: 2023-01-27

## 2023-01-27 RX ORDER — SIMETHICONE 80 MG/1
80 TABLET, CHEWABLE ORAL EVERY 4 HOURS
Refills: 0 | Status: DISCONTINUED | OUTPATIENT
Start: 2023-01-27 | End: 2023-01-29

## 2023-01-27 RX ORDER — ONDANSETRON 8 MG/1
4 TABLET, FILM COATED ORAL EVERY 6 HOURS
Refills: 0 | Status: DISCONTINUED | OUTPATIENT
Start: 2023-01-27 | End: 2023-01-28

## 2023-01-27 RX ORDER — MAGNESIUM HYDROXIDE 400 MG/1
30 TABLET, CHEWABLE ORAL
Refills: 0 | Status: DISCONTINUED | OUTPATIENT
Start: 2023-01-27 | End: 2023-01-29

## 2023-01-27 RX ORDER — OXYCODONE HYDROCHLORIDE 5 MG/1
10 TABLET ORAL
Refills: 0 | Status: DISCONTINUED | OUTPATIENT
Start: 2023-01-27 | End: 2023-01-27

## 2023-01-27 RX ORDER — HEPARIN SODIUM 5000 [USP'U]/ML
5000 INJECTION INTRAVENOUS; SUBCUTANEOUS EVERY 12 HOURS
Refills: 0 | Status: DISCONTINUED | OUTPATIENT
Start: 2023-01-27 | End: 2023-01-29

## 2023-01-27 RX ORDER — TETANUS TOXOID, REDUCED DIPHTHERIA TOXOID AND ACELLULAR PERTUSSIS VACCINE, ADSORBED 5; 2.5; 8; 8; 2.5 [IU]/.5ML; [IU]/.5ML; UG/.5ML; UG/.5ML; UG/.5ML
0.5 SUSPENSION INTRAMUSCULAR ONCE
Refills: 0 | Status: DISCONTINUED | OUTPATIENT
Start: 2023-01-27 | End: 2023-01-29

## 2023-01-27 RX ORDER — OXYCODONE HYDROCHLORIDE 5 MG/1
5 TABLET ORAL ONCE
Refills: 0 | Status: DISCONTINUED | OUTPATIENT
Start: 2023-01-27 | End: 2023-01-29

## 2023-01-27 RX ORDER — DIPHENHYDRAMINE HCL 50 MG
25 CAPSULE ORAL ONCE
Refills: 0 | Status: DISCONTINUED | OUTPATIENT
Start: 2023-01-27 | End: 2023-01-29

## 2023-01-27 RX ORDER — SODIUM CHLORIDE 9 MG/ML
1000 INJECTION INTRAMUSCULAR; INTRAVENOUS; SUBCUTANEOUS
Refills: 0 | Status: DISCONTINUED | OUTPATIENT
Start: 2023-01-27 | End: 2023-01-27

## 2023-01-27 RX ADMIN — Medication 108 GRAM(S): at 10:30

## 2023-01-27 RX ADMIN — Medication 30 MILLIGRAM(S): at 18:05

## 2023-01-27 RX ADMIN — Medication 108 GRAM(S): at 06:15

## 2023-01-27 RX ADMIN — SODIUM CHLORIDE 1000 MILLILITER(S): 9 INJECTION INTRAMUSCULAR; INTRAVENOUS; SUBCUTANEOUS at 09:14

## 2023-01-27 RX ADMIN — Medication 30 MILLIGRAM(S): at 17:51

## 2023-01-27 RX ADMIN — Medication 108 GRAM(S): at 02:43

## 2023-01-27 RX ADMIN — HEPARIN SODIUM 5000 UNIT(S): 5000 INJECTION INTRAVENOUS; SUBCUTANEOUS at 17:51

## 2023-01-27 RX ADMIN — SODIUM CHLORIDE 125 MILLILITER(S): 9 INJECTION, SOLUTION INTRAVENOUS at 05:41

## 2023-01-27 RX ADMIN — ONDANSETRON 4 MILLIGRAM(S): 8 TABLET, FILM COATED ORAL at 17:51

## 2023-01-27 RX ADMIN — Medication 4 MILLIGRAM(S): at 20:05

## 2023-01-27 NOTE — OB PROVIDER DELIVERY SUMMARY - NSATTENDATTESTATION_OBGYN_A_OB
MORPHINE MILDLY AFFECTIVE PATIENT STILL COMPLAINS OF PAIN IN LOWER BACK
APPOLOGIZES FOR EARLIER BEHAVIOR, 1MG ATIVAN
GIVEN LEFT DELTOID I was physically present and participated in this delivery.

## 2023-01-27 NOTE — OB PROVIDER LABOR PROGRESS NOTE - NS_SUBJECTIVE/OBJECTIVE_OBGYN_ALL_OB_FT
R1 OB Labor Note    S: Patient seen and examined at bedside. IUPC replaced.    T(C): 37.0 (01-27-23 @ 03:00), Max: 37.3 (01-26-23 @ 20:00)  HR: 84 (01-27-23 @ 03:16) (73 - 115)  BP: 114/56 (01-27-23 @ 03:16) (83/44 - 141/83)  RR: 20 (01-27-23 @ 03:00) (16 - 20)  SpO2: 95% (01-27-23 @ 03:16) (77% - 100%)
Patient seen at bedside for deceleration 1-2 minutes. Patient comfortable. Turned to right side, IVF bolus running. FHT returned to baseline.    SVE: unchanged.
Patient tolerated exam well. Patient examined because she felt sustained rectal pressure. Patient's ISE was also replaced. Patient also examined by Dr. Qureshi. Patient found to only be 3cm on exam. Patient counseled on criteria for failed induction. Dr. Qureshi discussed possibility of a  with the patient.
Patient seen and evaluated 2/2 category II tracing. Epidural in place and effective. PO Cytotec x1 dose given at 11am.    Vital Signs Last 24 Hrs  T(C): 36.8 (26 Jan 2023 12:25), Max: 37.1 (26 Jan 2023 09:15)  T(F): 98.24 (26 Jan 2023 12:25), Max: 98.8 (26 Jan 2023 09:15)  HR: 78 (26 Jan 2023 13:30) (77 - 101)  BP: 115/65 (26 Jan 2023 13:25) (83/44 - 141/83)  RR: 16 (26 Jan 2023 09:15) (16 - 16)  SpO2: 100% (26 Jan 2023 13:30) (89% - 100%)
Patient seen and examined for cervical exam. s/p epidural placement with BPs 80s/40s noted after completion. Anesthesia at bedside for evaluation and IV bolus continues.    Vital Signs Last 24 Hrs  T(C): 37.1 (26 Jan 2023 09:15), Max: 37.1 (26 Jan 2023 09:15)  T(F): 98.8 (26 Jan 2023 09:15), Max: 98.8 (26 Jan 2023 09:15)  HR: 91 (26 Jan 2023 11:00) (79 - 99)  BP: 116/60 (26 Jan 2023 10:54) (83/44 - 141/83)  RR: 16 (26 Jan 2023 09:15) (16 - 16)  SpO2: 100% (26 Jan 2023 11:00) (89% - 100%)

## 2023-01-27 NOTE — DISCHARGE NOTE OB - MATERIALS PROVIDED
Vaccinations/[facility]  Screening Program/  Immunization Record/Bottle Feeding Log/Breastfeeding Mother’s Support Group Information/Guide to Postpartum Care/[facility] Hearing Screen Program/Back To Sleep Handout/Shaken Baby Prevention Handout/Breastfeeding Guide and Packet/Birth Certificate Instructions/Tdap Vaccination (VIS Pub Date: 2012)

## 2023-01-27 NOTE — OB RN INTRAOPERATIVE NOTE - NSSELHIDDEN_OBGYN_ALL_OB_FT
[NS_DeliveryAttending1_OBGYN_ALL_OB_FT:[id number]],[NS_DeliveryAssist1_OBGYN_ALL_OB_FT:[id number]=],[NS_DeliveryRN_OBGYN_ALL_OB_FT:[id number]=],[NS_CirculateRN2_OBGYN_ALL_OB_FT:[id number]=]

## 2023-01-27 NOTE — OB PROVIDER LABOR PROGRESS NOTE - NS_OBIHIFHRDETAILS_OBGYN_ALL_OB_FT
110s/mod/no accel/no decel
140/mod bert/-acels/intermittent late deceleration
125 bpm, minimal to moderate variability, +accels, intermittent late and variable decelerations, prolonged deceleration x1
125 bpm, moderate variability, +accels, one late decel noted, overall reassuring
120/mod/intermittent late decels, no accels

## 2023-01-27 NOTE — OB PROVIDER LABOR PROGRESS NOTE - ASSESSMENT
A/P:   - Labor:  IOL for PPROM. Not making change, cervix extremely swollen. Will attempt a dose of benadryl for swelling. IUPC replaced, tracing remains cat 2. Continue pitocin.  - Fetus: Cat 2  - GBS: negative  - Pain: epidural    Carine Panella, PGY-1   A/P:   - Labor:  IOL for PPROM. Not making change, cervix extremely swollen. Will attempt a dose of benadryl for swelling. IUPC replaced, tracing remains cat 2. Continue pitocin.  - Fetus: Cat 2  - GBS: negative  - Pain: epidural    Carine Panella, PGY-1    Attending:   Late decels categ 2 tracing  Pitocin d/c'd  will consider restart after tracing improves  ve unchanged and cervix swollen   delivery discussed with patient  recommend if categ 2 continues or if resumes after pit restarted  -ANAID Spears MD

## 2023-01-27 NOTE — DISCHARGE NOTE OB - CARE PLAN
1 Principal Discharge DX:	 delivery delivered  Assessment and plan of treatment:	normal diet and activity

## 2023-01-27 NOTE — DISCHARGE NOTE OB - CARE PROVIDER_API CALL
Davina Spears)  Obstetrics and Gynecology  [address], [address]  [place] [address]  Phone: [phone]  Fax: [phone]  Follow Up Time:

## 2023-01-27 NOTE — CHART NOTE - NSCHARTNOTEFT_GEN_A_CORE
Pt feeling pressure  NST categ 2 - moderate variability, +accels, occasional variable decels  ctx q2-3  ve 4/60/-2  continue pitocin induction for PPROM at 36w  GDMA1

## 2023-01-27 NOTE — OB RN DELIVERY SUMMARY - NSSELHIDDEN_OBGYN_ALL_OB_FT
[NS_DeliveryAttending1_OBGYN_ALL_OB_FT:[id number]],[NS_DeliveryAssist1_OBGYN_ALL_OB_FT:[id number]=],[NS_DeliveryRN_OBGYN_ALL_OB_FT:[email or web address]=],[NS_CirculateRN2_OBGYN_ALL_OB_FT:[id number]=]

## 2023-01-27 NOTE — OB RN DELIVERY SUMMARY - NS_CORDBLDREASONA_OBGYN_ALL_OB
For information on Fall & Injury Prevention, visit: [email or web address] OR  [email or web address] OR  https://www.cdc.gov/steadi/patient.html Mother is RH Positive

## 2023-01-27 NOTE — OB PROVIDER DELIVERY SUMMARY - NSSELHIDDEN_OBGYN_ALL_OB_FT
[NS_DeliveryAttending1_OBGYN_ALL_OB_FT:[id number]],[NS_DeliveryAssist1_OBGYN_ALL_OB_FT:[id number]=],[NS_DeliveryRN_OBGYN_ALL_OB_FT:[id number]=],[NS_CirculateRN2_OBGYN_ALL_OB_FT:[id number]=]

## 2023-01-27 NOTE — DISCHARGE NOTE OB - PATIENT PORTAL LINK FT
You can access the FollowMyHealth Patient Portal offered by [facility] by registering at the following website: [email or web address]. By joining [employer]’s FollowMyHealth portal, you will also be able to view your health information using other applications (apps) compatible with our system.

## 2023-01-27 NOTE — OB RN DELIVERY SUMMARY - BABY A: WEIGHT IN POUNDS (FROM GRAMS), DELIVERY
Received pt in  pt calm & cooperative c/o depression & Si pt denies Hi/AVh at present, pt oriented to unit emotional reassurance provided eval on going.
6

## 2023-01-27 NOTE — OB RN DELIVERY SUMMARY - NS_LABORCHARACTER_OBGYN_ALL_OB
Internal electronic FM/External electronic FM/Antibiotics in labor Augmentation of labor/Internal electronic FM/External electronic FM/Antibiotics in labor

## 2023-01-27 NOTE — OB PROVIDER DELIVERY SUMMARY - NSPROVIDERDELIVERYNOTE_OBGYN_ALL_OB_FT
Procedure: pLTCS  Preop Dx: IUP@36w1d GA, failed induction, prolonged rupture of membranes  viable M infant, cephalic presentation, weight 3050g, APGARS 9/9  grossly normal uterus, b/l tubes and ovaries  Layers of uterine closure:  hysterotomy closed in 1 layer, bilateral extensions incorporated in hysterotomy repair 1.0 caprosyn suture.  rectus muscle reapproximated with 2.0 chromic suture    Complications: none  Specimen: none   Findings: as above  Hemostatic/intraoperative agents: none  517/1700/400    Dictation #: Procedure: pLTCS  Preop Dx: IUP@36w1d GA, failed induction, prolonged rupture of membranes  viable M infant, cephalic presentation, weight 3050g, APGARS 9/9  grossly normal uterus, b/l tubes and ovaries  Layers of uterine closure:  hysterotomy closed in 1 layer, bilateral extensions incorporated in hysterotomy repair 1.0 caprosyn suture.  rectus muscle reapproximated with 2.0 chromic suture    Complications: none  Specimen: none   Findings: as above  Hemostatic/intraoperative agents: none  517/1700/400    Dictation #:[id number]

## 2023-01-27 NOTE — OB RN DELIVERY SUMMARY - NS_SEPSISRSKCALC_OBGYN_ALL_OB_FT
EOS calculated successfully. EOS Risk Factor: 0.5/1000 live births ([facility] national incidence); GA=36w1d; Temp=99.14; ROM=33.683; GBS='Unknown'; Antibiotics='GBS specific antibiotics > 2 hrs prior to birth'

## 2023-01-28 LAB
BASOPHILS # BLD AUTO: 0.04 K/UL — SIGNIFICANT CHANGE UP (ref 0–0.2)
BASOPHILS NFR BLD AUTO: 0.2 % — SIGNIFICANT CHANGE UP (ref 0–2)
EOSINOPHIL # BLD AUTO: 0.11 K/UL — SIGNIFICANT CHANGE UP (ref 0–0.5)
EOSINOPHIL NFR BLD AUTO: 0.5 % — SIGNIFICANT CHANGE UP (ref 0–6)
HCT VFR BLD CALC: 32.3 % — LOW (ref 34.5–45)
HGB BLD-MCNC: 10.7 G/DL — LOW (ref 11.5–15.5)
IANC: 17.8 K/UL — HIGH (ref 1.8–7.4)
IMM GRANULOCYTES NFR BLD AUTO: 0.7 % — SIGNIFICANT CHANGE UP (ref 0–0.9)
LYMPHOCYTES # BLD AUTO: 1.27 K/UL — SIGNIFICANT CHANGE UP (ref 1–3.3)
LYMPHOCYTES # BLD AUTO: 6.2 % — LOW (ref 13–44)
MCHC RBC-ENTMCNC: 30.4 PG — SIGNIFICANT CHANGE UP (ref 27–34)
MCHC RBC-ENTMCNC: 33.1 GM/DL — SIGNIFICANT CHANGE UP (ref 32–36)
MCV RBC AUTO: 91.8 FL — SIGNIFICANT CHANGE UP (ref 80–100)
MONOCYTES # BLD AUTO: 1.14 K/UL — HIGH (ref 0–0.9)
MONOCYTES NFR BLD AUTO: 5.6 % — SIGNIFICANT CHANGE UP (ref 2–14)
NEUTROPHILS # BLD AUTO: 17.8 K/UL — HIGH (ref 1.8–7.4)
NEUTROPHILS NFR BLD AUTO: 86.8 % — HIGH (ref 43–77)
NRBC # BLD: 0 /100 WBCS — SIGNIFICANT CHANGE UP (ref 0–0)
NRBC # FLD: 0 K/UL — SIGNIFICANT CHANGE UP (ref 0–0)
PLATELET # BLD AUTO: 236 K/UL — SIGNIFICANT CHANGE UP (ref 150–400)
RBC # BLD: 3.52 M/UL — LOW (ref 3.8–5.2)
RBC # FLD: 14 % — SIGNIFICANT CHANGE UP (ref 10.3–14.5)
WBC # BLD: 20.51 K/UL — HIGH (ref 3.8–10.5)
WBC # FLD AUTO: 20.51 K/UL — HIGH (ref 3.8–10.5)

## 2023-01-28 RX ORDER — SENNA PLUS 8.6 MG/1
2 TABLET ORAL AT BEDTIME
Refills: 0 | Status: DISCONTINUED | OUTPATIENT
Start: 2023-01-28 | End: 2023-01-29

## 2023-01-28 RX ORDER — IBUPROFEN 200 MG
600 TABLET ORAL EVERY 6 HOURS
Refills: 0 | Status: DISCONTINUED | OUTPATIENT
Start: 2023-01-28 | End: 2023-01-29

## 2023-01-28 RX ORDER — FERROUS SULFATE 325(65) MG
325 TABLET ORAL DAILY
Refills: 0 | Status: DISCONTINUED | OUTPATIENT
Start: 2023-01-28 | End: 2023-01-29

## 2023-01-28 RX ADMIN — Medication 600 MILLIGRAM(S): at 09:32

## 2023-01-28 RX ADMIN — Medication 600 MILLIGRAM(S): at 10:39

## 2023-01-28 RX ADMIN — HEPARIN SODIUM 5000 UNIT(S): 5000 INJECTION INTRAVENOUS; SUBCUTANEOUS at 19:25

## 2023-01-28 RX ADMIN — MAGNESIUM HYDROXIDE 30 MILLILITER(S): 400 TABLET, CHEWABLE ORAL at 16:25

## 2023-01-28 RX ADMIN — SIMETHICONE 80 MILLIGRAM(S): 80 TABLET, CHEWABLE ORAL at 09:32

## 2023-01-28 RX ADMIN — HEPARIN SODIUM 5000 UNIT(S): 5000 INJECTION INTRAVENOUS; SUBCUTANEOUS at 06:28

## 2023-01-28 RX ADMIN — Medication 600 MILLIGRAM(S): at 23:56

## 2023-01-28 NOTE — PROGRESS NOTE ADULT - ATTENDING COMMENTS
Patient seen and examined at bedside  Agree with the above note and plan  Patient states that she is feeling well overall.  Patient for hopeful discharge tomorrow POD#2

## 2023-01-28 NOTE — PROGRESS NOTE ADULT - ASSESSMENT
A/P: 43yo POD#1 s/p LTCS.  Patient is stable and doing well post-operatively.    - Continue regular diet.  - Increase ambulation.  - Continue motrin, tylenol, oxycodone PRN for pain control.    - F/u AM CBC    Morteza Durán PGY1 A/P: 41yo POD#1 s/p pLTCS 2/2 failed induction QBL 517cc.  Patient is stable and doing well post-operatively.    - Continue regular diet.  - Increase ambulation.  - Continue motrin, tylenol, oxycodone PRN for pain control.    - F/u AM CBC    Morteza Durán PGY1

## 2023-01-29 VITALS
OXYGEN SATURATION: 99 % | RESPIRATION RATE: 16 BRPM | SYSTOLIC BLOOD PRESSURE: 128 MMHG | HEART RATE: 76 BPM | TEMPERATURE: 98 F | DIASTOLIC BLOOD PRESSURE: 77 MMHG

## 2023-01-29 LAB
HCT VFR BLD CALC: 31.6 % — LOW (ref 34.5–45)
HGB BLD-MCNC: 10.2 G/DL — LOW (ref 11.5–15.5)
MCHC RBC-ENTMCNC: 30.2 PG — SIGNIFICANT CHANGE UP (ref 27–34)
MCHC RBC-ENTMCNC: 32.3 GM/DL — SIGNIFICANT CHANGE UP (ref 32–36)
MCV RBC AUTO: 93.5 FL — SIGNIFICANT CHANGE UP (ref 80–100)
NRBC # BLD: 0 /100 WBCS — SIGNIFICANT CHANGE UP (ref 0–0)
NRBC # FLD: 0 K/UL — SIGNIFICANT CHANGE UP (ref 0–0)
PLATELET # BLD AUTO: 225 K/UL — SIGNIFICANT CHANGE UP (ref 150–400)
RBC # BLD: 3.38 M/UL — LOW (ref 3.8–5.2)
RBC # FLD: 13.9 % — SIGNIFICANT CHANGE UP (ref 10.3–14.5)
WBC # BLD: 11.13 K/UL — HIGH (ref 3.8–10.5)
WBC # FLD AUTO: 11.13 K/UL — HIGH (ref 3.8–10.5)

## 2023-01-29 RX ORDER — ASPIRIN/CALCIUM CARB/MAGNESIUM 324 MG
0 TABLET ORAL
Qty: 0 | Refills: 0 | DISCHARGE

## 2023-01-29 RX ORDER — IBUPROFEN 200 MG
1 TABLET ORAL
Qty: 0 | Refills: 0 | DISCHARGE
Start: 2023-01-29

## 2023-01-29 RX ADMIN — HEPARIN SODIUM 5000 UNIT(S): 5000 INJECTION INTRAVENOUS; SUBCUTANEOUS at 05:33

## 2023-01-29 RX ADMIN — Medication 600 MILLIGRAM(S): at 05:32

## 2023-01-29 RX ADMIN — Medication 600 MILLIGRAM(S): at 06:08

## 2023-01-29 RX ADMIN — Medication 600 MILLIGRAM(S): at 00:22

## 2023-01-29 NOTE — PROGRESS NOTE ADULT - SUBJECTIVE AND OBJECTIVE BOX
Postpartum Note,  Section  She is a  42y woman who is now post-operative day: 2    Subjective:  The patient feels well.  She is ambulating.   She is tolerating regular diet.  She is voiding.  Her pain is controlled.  She reports normal postpartum bleeding.  requesting d/c home now    Physical exam:    Vital Signs Last 24 Hrs  T(C): 36.9 (2023 05:42), Max: 36.9 (2023 10:21)  T(F): 98.4 (2023 05:42), Max: 98.5 (2023 22:16)  HR: 71 (2023 05:42) (71 - 82)  BP: 119/68 (2023 05:42) (98/65 - 133/71)  BP(mean): --  RR: 17 (2023 05:42) (17 - 17)  SpO2: 98% (2023 05:42) (97% - 100%)    Parameters below as of 2023 22:16  Patient On (Oxygen Delivery Method): room air        Gen: NAD  Abdomen: Soft, nontender, no distension , firm uterine fundus at umbilicus.  Incision: Clean, dry, and intact   Ext: No calf tenderness    LABS:                        10.2   11.13 )-----------( 225      ( 2023 06:47 )             31.6                         10.7   20.51 )-----------( 236      ( 2023 06:37 )             32.3                   Allergies    No Known Allergies    Intolerances      MEDICATIONS  (STANDING):  acetaminophen     Tablet .. 975 milliGRAM(s) Oral <User Schedule>  diphenhydrAMINE Elixir 25 milliGRAM(s) Oral once  diphtheria/tetanus/pertussis (acellular) Vaccine (Adacel) 0.5 milliLiter(s) IntraMuscular once  ferrous    sulfate 325 milliGRAM(s) Oral daily  heparin   Injectable 5000 Unit(s) SubCutaneous every 12 hours  ibuprofen  Tablet. 600 milliGRAM(s) Oral every 6 hours  lactated ringers Bolus 500 milliLiter(s) IV Bolus once  prenatal multivitamin 1 Tablet(s) Oral daily  senna 2 Tablet(s) Oral at bedtime    MEDICATIONS  (PRN):  diphenhydrAMINE 25 milliGRAM(s) Oral every 6 hours PRN Pruritus  lanolin Ointment 1 Application(s) Topical every 6 hours PRN Sore Nipples  magnesium hydroxide Suspension 30 milliLiter(s) Oral two times a day PRN Constipation  oxyCODONE    IR 5 milliGRAM(s) Oral every 3 hours PRN Moderate to Severe Pain (4-10)  oxyCODONE    IR 5 milliGRAM(s) Oral once PRN Moderate to Severe Pain (4-10)  simethicone 80 milliGRAM(s) Chew every 4 hours PRN Gas        Assessment and Plan  POD #2 s/p  section  Doing well.  d/c home today  KAITY Sandoval MD    
POST OP DAY  1  s/p   SECTION    SUBJECTIVE:  I'm ok.    PAIN SCALE SCORE: [x] Refer to charted pain scores    THERAPY:  [  ] Spinal morphine   [  x] Epidural morphine   [  ] IV PCA Hydromorphone 1 mg/ml    OBJECTIVE:  Comfortable Appearing    SEDATION SCORE:	  [ x ] Alert	    [  ] Drowsy        [  ] Arousable	[  ] Asleep	[  ] Unresponsive    Side Effects:	  [ x ] None	     [  ] Nausea        [  ] Pruritus        [  ] Weakness   [  ] Numbness        ASSESSMENT/ PLAN   [   ] Discontinue         [  ] Continue    [ x ] Change to prn Analgesics as per primary service.    DOCUMENTATION & VERIFICATION OF CURRENT MEDS [ x ] Done    COMMENTS: No Headache.  
OB Progress Note:  Delivery, POD#1    S: 43yo POD#1 s/p LTCS . Her pain is well controlled. She is tolerating a regular diet and passing flatus. Denies N/V. Denies CP/SOB/lightheadedness/dizziness.   She is ambulating without difficulty.   Voiding spontaneously.     O:   Vital Signs Last 24 Hrs  T(C): 37 (2023 06:00), Max: 37.1 (2023 12:45)  T(F): 98.6 (2023 06:00), Max: 98.8 (2023 12:45)  HR: 63 (2023 06:00) (58 - 100)  BP: 107/60 (2023 06:00) (83/51 - 139/60)  BP(mean): 76 (2023 16:00) (47 - 77)  RR: 18 (2023 06:00) (10 - 21)  SpO2: 100% (2023 06:00) (78% - 100%)    Parameters below as of 2023 22:00  Patient On (Oxygen Delivery Method): room air        Labs:  Blood type: AB Positive  Rubella IgG: RPR: Negative                          13.0   13.77<H> >-----------< 219    (  @ 08:30 )             39.8                  PE:  General: NAD  Abdomen: Mildly distended, appropriately tender, incision c/d/i.  Extremities: No erythema, no pitting edema

## 2023-01-31 ENCOUNTER — NON-APPOINTMENT (OUTPATIENT)
Age: 43
End: 2023-01-31

## 2023-01-31 ENCOUNTER — APPOINTMENT (OUTPATIENT)
Dept: OBGYN | Facility: CLINIC | Age: 43
End: 2023-01-31

## 2023-01-31 ENCOUNTER — APPOINTMENT (OUTPATIENT)
Dept: MATERNAL FETAL MEDICINE | Facility: CLINIC | Age: 43
End: 2023-01-31

## 2023-02-01 ENCOUNTER — APPOINTMENT (OUTPATIENT)
Dept: ANTEPARTUM | Facility: CLINIC | Age: 43
End: 2023-02-01

## 2023-02-06 PROBLEM — Z78.9 OTHER SPECIFIED HEALTH STATUS: Chronic | Status: ACTIVE | Noted: 2023-01-26

## 2023-02-07 ENCOUNTER — APPOINTMENT (OUTPATIENT)
Dept: OBGYN | Facility: CLINIC | Age: 43
End: 2023-02-07

## 2023-02-07 ENCOUNTER — APPOINTMENT (OUTPATIENT)
Dept: OBGYN | Facility: CLINIC | Age: 43
End: 2023-02-07
Payer: COMMERCIAL

## 2023-02-07 VITALS — DIASTOLIC BLOOD PRESSURE: 72 MMHG | SYSTOLIC BLOOD PRESSURE: 114 MMHG

## 2023-02-07 PROCEDURE — 0503F POSTPARTUM CARE VISIT: CPT

## 2023-02-08 ENCOUNTER — APPOINTMENT (OUTPATIENT)
Dept: ANTEPARTUM | Facility: CLINIC | Age: 43
End: 2023-02-08

## 2023-02-14 ENCOUNTER — APPOINTMENT (OUTPATIENT)
Dept: OBGYN | Facility: CLINIC | Age: 43
End: 2023-02-14

## 2023-02-15 ENCOUNTER — APPOINTMENT (OUTPATIENT)
Dept: ANTEPARTUM | Facility: CLINIC | Age: 43
End: 2023-02-15

## 2023-02-22 ENCOUNTER — APPOINTMENT (OUTPATIENT)
Dept: OBGYN | Facility: CLINIC | Age: 43
End: 2023-02-22

## 2023-03-07 ENCOUNTER — APPOINTMENT (OUTPATIENT)
Dept: OBGYN | Facility: CLINIC | Age: 43
End: 2023-03-07
Payer: COMMERCIAL

## 2023-03-07 VITALS
BODY MASS INDEX: 32.49 KG/M2 | DIASTOLIC BLOOD PRESSURE: 80 MMHG | WEIGHT: 195 LBS | HEIGHT: 65 IN | SYSTOLIC BLOOD PRESSURE: 120 MMHG

## 2023-03-07 PROCEDURE — 0503F POSTPARTUM CARE VISIT: CPT

## 2023-05-10 NOTE — OB RN PREOPERATIVE CHECKLIST - NS_PREOPSPECIALEQ_OBGYN_ALL_OB
pt p/w dislodged feeding tube, stoma, had become smaller , 16 f tube was inserted and post placement XRAY was done, n/a

## 2023-06-14 ENCOUNTER — APPOINTMENT (OUTPATIENT)
Dept: OBGYN | Facility: CLINIC | Age: 43
End: 2023-06-14
Payer: COMMERCIAL

## 2023-06-14 VITALS
WEIGHT: 194 LBS | SYSTOLIC BLOOD PRESSURE: 142 MMHG | BODY MASS INDEX: 32.32 KG/M2 | DIASTOLIC BLOOD PRESSURE: 80 MMHG | HEIGHT: 65 IN

## 2023-06-14 DIAGNOSIS — Z3A.32 32 WEEKS GESTATION OF PREGNANCY: ICD-10-CM

## 2023-06-14 DIAGNOSIS — Z11.59 ENCOUNTER FOR SCREENING FOR OTHER VIRAL DISEASES: ICD-10-CM

## 2023-06-14 DIAGNOSIS — Z87.59 PERSONAL HISTORY OF OTHER COMPLICATIONS OF PREGNANCY, CHILDBIRTH AND THE PUERPERIUM: ICD-10-CM

## 2023-06-14 DIAGNOSIS — Z87.42 PERSONAL HISTORY OF OTHER DISEASES OF THE FEMALE GENITAL TRACT: ICD-10-CM

## 2023-06-14 DIAGNOSIS — R60.9 EDEMA, UNSPECIFIED: ICD-10-CM

## 2023-06-14 DIAGNOSIS — O23.40 UNSPECIFIED INFECTION OF URINARY TRACT IN PREGNANCY, UNSPECIFIED TRIMESTER: ICD-10-CM

## 2023-06-14 DIAGNOSIS — Z11.3 ENCOUNTER FOR SCREENING FOR INFECTIONS WITH A PREDOMINANTLY SEXUAL MODE OF TRANSMISSION: ICD-10-CM

## 2023-06-14 DIAGNOSIS — Z3A.20 20 WEEKS GESTATION OF PREGNANCY: ICD-10-CM

## 2023-06-14 DIAGNOSIS — Z3A.16 16 WEEKS GESTATION OF PREGNANCY: ICD-10-CM

## 2023-06-14 DIAGNOSIS — Z86.32 PERSONAL HISTORY OF GESTATIONAL DIABETES: ICD-10-CM

## 2023-06-14 DIAGNOSIS — Z3A.34 34 WEEKS GESTATION OF PREGNANCY: ICD-10-CM

## 2023-06-14 DIAGNOSIS — Z01.419 ENCOUNTER FOR GYNECOLOGICAL EXAMINATION (GENERAL) (ROUTINE) W/OUT ABNORMAL FINDINGS: ICD-10-CM

## 2023-06-14 DIAGNOSIS — Z3A.12 12 WEEKS GESTATION OF PREGNANCY: ICD-10-CM

## 2023-06-14 DIAGNOSIS — Z3A.28 28 WEEKS GESTATION OF PREGNANCY: ICD-10-CM

## 2023-06-14 DIAGNOSIS — Z86.69 PERSONAL HISTORY OF OTHER DISEASES OF THE NERVOUS SYSTEM AND SENSE ORGANS: ICD-10-CM

## 2023-06-14 DIAGNOSIS — Z3A.30 30 WEEKS GESTATION OF PREGNANCY: ICD-10-CM

## 2023-06-14 DIAGNOSIS — Z3A.24 24 WEEKS GESTATION OF PREGNANCY: ICD-10-CM

## 2023-06-14 PROCEDURE — 99396 PREV VISIT EST AGE 40-64: CPT

## 2023-06-21 PROBLEM — Z86.32 HISTORY OF GESTATIONAL DIABETES MELLITUS (GDM): Status: RESOLVED | Noted: 2022-12-07 | Resolved: 2023-06-21

## 2023-06-21 PROBLEM — Z3A.24 24 WEEKS GESTATION OF PREGNANCY: Status: RESOLVED | Noted: 2022-11-07 | Resolved: 2023-02-07

## 2023-06-21 PROBLEM — Z87.59 HISTORY OF THREATENED ABORTION: Status: RESOLVED | Noted: 2022-07-20 | Resolved: 2023-06-21

## 2023-06-21 PROBLEM — R60.9 SWELLING: Status: RESOLVED | Noted: 2022-12-21 | Resolved: 2023-06-21

## 2023-06-21 PROBLEM — Z3A.12 12 WEEKS GESTATION OF PREGNANCY: Status: RESOLVED | Noted: 2022-08-17 | Resolved: 2023-02-07

## 2023-06-21 PROBLEM — Z87.42 HISTORY OF VAGINAL DISCHARGE: Status: RESOLVED | Noted: 2022-07-11 | Resolved: 2023-06-21

## 2023-06-21 PROBLEM — Z11.59 SCREENING FOR VIRAL DISEASE: Status: RESOLVED | Noted: 2022-08-17 | Resolved: 2023-06-21

## 2023-06-21 PROBLEM — Z3A.32 32 WEEKS GESTATION OF PREGNANCY: Status: RESOLVED | Noted: 2023-01-03 | Resolved: 2023-02-07

## 2023-06-21 PROBLEM — Z3A.34 34 WEEKS GESTATION OF PREGNANCY: Status: RESOLVED | Noted: 2023-01-17 | Resolved: 2023-02-07

## 2023-06-21 PROBLEM — Z3A.20 20 WEEKS GESTATION OF PREGNANCY: Status: RESOLVED | Noted: 2022-10-11 | Resolved: 2023-02-07

## 2023-06-21 PROBLEM — O23.40 UTI IN PREGNANCY: Status: RESOLVED | Noted: 2022-08-18 | Resolved: 2023-06-21

## 2023-06-21 PROBLEM — Z3A.28 28 WEEKS GESTATION OF PREGNANCY: Status: RESOLVED | Noted: 2022-12-06 | Resolved: 2023-02-07

## 2023-06-21 PROBLEM — Z87.42 HISTORY OF AMENORRHEA: Status: RESOLVED | Noted: 2022-07-05 | Resolved: 2023-02-07

## 2023-06-21 PROBLEM — Z3A.30 30 WEEKS GESTATION OF PREGNANCY: Status: RESOLVED | Noted: 2022-12-20 | Resolved: 2023-02-07

## 2023-06-21 PROBLEM — Z86.69 HISTORY OF SCIATICA: Status: RESOLVED | Noted: 2022-12-21 | Resolved: 2023-06-21

## 2023-06-21 PROBLEM — Z3A.16 16 WEEKS GESTATION OF PREGNANCY: Status: RESOLVED | Noted: 2022-09-13 | Resolved: 2023-02-07

## 2023-06-21 PROBLEM — Z11.3 SCREEN FOR STD (SEXUALLY TRANSMITTED DISEASE): Status: RESOLVED | Noted: 2022-07-05 | Resolved: 2023-06-21

## 2023-06-21 RX ORDER — URINE ACETONE TEST STRIPS
STRIP MISCELLANEOUS
Qty: 1 | Refills: 2 | Status: DISCONTINUED | COMMUNITY
Start: 2022-12-19 | End: 2023-06-21

## 2023-06-21 RX ORDER — CEPHALEXIN 500 MG/1
500 CAPSULE ORAL EVERY 6 HOURS
Qty: 28 | Refills: 0 | Status: DISCONTINUED | COMMUNITY
Start: 2022-08-18 | End: 2023-06-21

## 2023-06-21 RX ORDER — BLOOD-GLUCOSE METER
W/DEVICE KIT MISCELLANEOUS
Qty: 1 | Refills: 0 | Status: DISCONTINUED | COMMUNITY
Start: 2022-12-07 | End: 2023-06-21

## 2023-06-21 RX ORDER — CLINDAMYCIN PHOSPHATE 20 MG/G
2 CREAM VAGINAL
Qty: 3 | Refills: 0 | Status: DISCONTINUED | COMMUNITY
Start: 2022-07-11 | End: 2023-06-21

## 2023-06-21 RX ORDER — LANCETS
EACH MISCELLANEOUS
Qty: 1 | Refills: 6 | Status: DISCONTINUED | COMMUNITY
Start: 2022-12-07 | End: 2023-06-21

## 2023-06-21 RX ORDER — BLOOD SUGAR DIAGNOSTIC
STRIP MISCELLANEOUS 4 TIMES DAILY
Qty: 3 | Refills: 6 | Status: DISCONTINUED | COMMUNITY
Start: 2022-12-07 | End: 2023-06-21

## 2023-07-02 LAB — CYTOLOGY CVX/VAG DOC THIN PREP: NORMAL

## 2024-01-04 NOTE — OB PROVIDER TRIAGE NOTE - NS_SONODONE_OBGYN_ALL_OB
EGD RECOMMENDATIONS:    1.  Await path results  2.  Continue PPI  3.  Minimize opioid use    Recommendations:  1. Repeat colonoscopy: pending pathology - 5 years, due to personal history of polyps.  2. Await biopsy results  3. Keep follow up as scheduled with Colin.    POST-OP ORDERS: ENDOSCOPY & COLONOSCOPY:    1. Rest today.    2. DO NOT eat or drink until wide awake; eat your usual diet today in moderate amount only.    3. DO NOT drive today.    4. Call physician if you have severe pain, vomiting, fever, rectal bleeding or black bowel movements.    5.  If a biopsy was taken or a polyp removed, you should expect to hear results in about 21 days.  If you have heard nothing from your physician by then, call the office for results.    6.  Discharge home when patient awake, vitals signs stable and tolerating liquids.    7. Call with questions or concerns [phone].     Yes

## 2025-03-16 NOTE — OB PROVIDER H&P - NSRISKFACTORS_OBGYN_ALL_OB
"   Expand All Collapse All    PRIMARY DIAGNOSIS: \"GENERIC\" NURSING  OUTPATIENT/OBSERVATION GOALS TO BE MET BEFORE DISCHARGE:  ADLs back to baseline: No     Activity and level of assistance: Up with standby assistance.     Pain status: Pain free.     Return to near baseline physical activity: Yes             Discharge Planner Nurse  Safe discharge environment identified: Yes  Barriers to discharge: Yes           Please review provider order for any additional goals.   Nurse to notify provider when observation goals have been met and patient is ready for discharge.        " Yes